# Patient Record
Sex: FEMALE | Race: BLACK OR AFRICAN AMERICAN | URBAN - METROPOLITAN AREA
[De-identification: names, ages, dates, MRNs, and addresses within clinical notes are randomized per-mention and may not be internally consistent; named-entity substitution may affect disease eponyms.]

---

## 2022-06-09 ENCOUNTER — EMERGENCY (EMERGENCY)
Facility: HOSPITAL | Age: 27
LOS: 1 days | Discharge: HOME | End: 2022-06-09
Attending: EMERGENCY MEDICINE
Payer: SELF-PAY

## 2022-06-09 VITALS
TEMPERATURE: 98 F | SYSTOLIC BLOOD PRESSURE: 119 MMHG | OXYGEN SATURATION: 98 % | DIASTOLIC BLOOD PRESSURE: 77 MMHG | HEIGHT: 67 IN | WEIGHT: 162.04 LBS | RESPIRATION RATE: 18 BRPM | HEART RATE: 82 BPM

## 2022-06-09 PROCEDURE — 99053 MED SERV 10PM-8AM 24 HR FAC: CPT

## 2022-06-09 PROCEDURE — 99282 EMERGENCY DEPT VISIT SF MDM: CPT

## 2022-06-09 PROCEDURE — 99283 EMERGENCY DEPT VISIT LOW MDM: CPT

## 2022-06-09 NOTE — ED ADULT TRIAGE NOTE - CHIEF COMPLAINT QUOTE
patient states that she cut her left foot on a tree yesterday states wound with redness and pus from bottom of foot

## 2022-06-10 NOTE — ED PROVIDER NOTE - PATIENT PORTAL LINK FT
You can access the FollowMyHealth Patient Portal offered by [facility] by registering at the following website: [email or web address]. By joining [employer]’s FollowMyHealth portal, you will also be able to view your health information using other applications (apps) compatible with our system.

## 2022-06-10 NOTE — ED PROVIDER NOTE - CLINICAL SUMMARY MEDICAL DECISION MAKING FREE TEXT BOX
skin avulsion to bottom of the foot, wrapped, cleaned and applied bacitracin, s/s of infection discussed

## 2022-06-10 NOTE — ED PROVIDER NOTE - NS ED ATTENDING STATEMENT MOD
This was a shared visit with the ELODIA. I reviewed and verified the documentation and independently performed the documented:

## 2022-06-10 NOTE — ED PROVIDER NOTE - OBJECTIVE STATEMENT
26 y/o female with no sign medical history presents to the ED c/o left foot pain. notes she was walkign in the streets yesterday barefoot and she scraped the bottom of her foot on the street. Notes covered it but became concerned today when she noticed some discharge form the wound. no fevers. NO pain to the bottom of her foot. able to walk without issues. Denies n/v, redness, swelling.

## 2022-06-10 NOTE — ED PROVIDER NOTE - PHYSICAL EXAMINATION
Superficial avulsion of the bottom of the left foot at the base of the great toe and the Metatarsal joint, FROM of the foot, 5/5 strength, no erythema , no discharge  Pulses- 2+ DP/PT

## 2022-08-31 ENCOUNTER — EMERGENCY (EMERGENCY)
Facility: HOSPITAL | Age: 27
LOS: 1 days | Discharge: HOME | End: 2022-08-31
Attending: EMERGENCY MEDICINE
Payer: SELF-PAY

## 2022-08-31 VITALS
SYSTOLIC BLOOD PRESSURE: 125 MMHG | OXYGEN SATURATION: 99 % | HEART RATE: 78 BPM | RESPIRATION RATE: 19 BRPM | TEMPERATURE: 98 F | DIASTOLIC BLOOD PRESSURE: 85 MMHG

## 2022-08-31 VITALS — WEIGHT: 149.91 LBS | HEIGHT: 67 IN
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
## 2022-08-31 DIAGNOSIS — Z88.0 ALLERGY STATUS TO PENICILLIN: ICD-10-CM

## 2022-08-31 DIAGNOSIS — S60.042A CONTUSION OF LEFT RING FINGER WITHOUT DAMAGE TO NAIL, INITIAL ENCOUNTER: ICD-10-CM
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
## 2022-08-31 DIAGNOSIS — S60.945A UNSPECIFIED SUPERFICIAL INJURY OF LEFT RING FINGER, INITIAL ENCOUNTER: ICD-10-CM

## 2022-08-31 PROCEDURE — 73140 X-RAY EXAM OF FINGER(S): CPT | Mod: LT

## 2022-08-31 PROCEDURE — 73140 X-RAY EXAM OF FINGER(S): CPT | Mod: 26,LT

## 2022-08-31 PROCEDURE — 99053 MED SERV 10PM-8AM 24 HR FAC: CPT

## 2022-08-31 PROCEDURE — 99283 EMERGENCY DEPT VISIT LOW MDM: CPT

## 2022-08-31 NOTE — ED ADULT TRIAGE NOTE - CHIEF COMPLAINT QUOTE
patient ambulatory to triage c/o L ring finger injury. patient unsure how injury occurred. patient c/o pain 10/10. swelling present to finger, unable to bend finger. allergic to amoxacillin.

## 2022-09-14 NOTE — ED PROVIDER NOTE - PATIENT PORTAL LINK FT
You can access the FollowMyHealth Patient Portal offered by [facility] by registering at the following website: [email or web address]. By joining [employer]’s FollowMyHealth portal, you will also be able to view your health information using other applications (apps) compatible with our system.

## 2022-09-14 NOTE — ED PROVIDER NOTE - OBJECTIVE STATEMENT
patient ambulatory to triage c/o L ring finger injury. patient unsure how injury occurred. patient c/o pain 10/10. swelling present to finger, unable to bend finger completely no left hand or left wrist pain. no overt motor or sensory deficits. no other acute issues symptoms or concerns.

## 2022-09-18 ENCOUNTER — EMERGENCY (EMERGENCY)
Facility: HOSPITAL | Age: 27
LOS: 0 days | Discharge: HOME | End: 2022-09-18
Attending: EMERGENCY MEDICINE
Payer: SELF-PAY

## 2022-09-18 VITALS
DIASTOLIC BLOOD PRESSURE: 78 MMHG | HEART RATE: 64 BPM | SYSTOLIC BLOOD PRESSURE: 128 MMHG | RESPIRATION RATE: 16 BRPM | TEMPERATURE: 98 F | OXYGEN SATURATION: 100 %

## 2022-09-18 VITALS — HEIGHT: 68 IN | WEIGHT: 134.92 LBS

## 2022-09-18 DIAGNOSIS — M79.89 OTHER SPECIFIED SOFT TISSUE DISORDERS: ICD-10-CM

## 2022-09-18 DIAGNOSIS — Z88.0 ALLERGY STATUS TO PENICILLIN: ICD-10-CM

## 2022-09-18 DIAGNOSIS — H57.10 OCULAR PAIN, UNSPECIFIED EYE: ICD-10-CM

## 2022-09-18 DIAGNOSIS — H10.9 UNSPECIFIED CONJUNCTIVITIS: ICD-10-CM

## 2022-09-18 PROCEDURE — 99283 EMERGENCY DEPT VISIT LOW MDM: CPT

## 2022-09-18 PROCEDURE — 99284 EMERGENCY DEPT VISIT MOD MDM: CPT

## 2022-09-18 RX ORDER — FLUORESCEIN SODIUM 9 MG
1 STRIP OPHTHALMIC (EYE) ONCE
Refills: 0 | Status: COMPLETED | OUTPATIENT
Start: 2022-09-18 | End: 2022-09-18

## 2022-09-18 RX ORDER — CIPROFLOXACIN LACTATE 400MG/40ML
500 VIAL (ML) INTRAVENOUS ONCE
Refills: 0 | Status: COMPLETED | OUTPATIENT
Start: 2022-09-18 | End: 2022-09-18

## 2022-09-18 RX ORDER — CIPROFLOXACIN HCL 0.3 %
2 DROPS OPHTHALMIC (EYE) ONCE
Refills: 0 | Status: COMPLETED | OUTPATIENT
Start: 2022-09-18 | End: 2022-09-18

## 2022-09-18 RX ORDER — OFLOXACIN 0.3 %
1 DROPS OPHTHALMIC (EYE) ONCE
Refills: 0 | Status: DISCONTINUED | OUTPATIENT
Start: 2022-09-18 | End: 2022-09-18

## 2022-09-18 RX ORDER — CIPROFLOXACIN HCL 0.3 %
2 DROPS OPHTHALMIC (EYE)
Qty: 1 | Refills: 0
Start: 2022-09-18 | End: 2022-09-22

## 2022-09-18 RX ADMIN — Medication 1 DROP(S): at 23:42

## 2022-09-18 RX ADMIN — Medication 500 MILLIGRAM(S): at 23:51

## 2022-09-18 RX ADMIN — Medication 1 APPLICATION(S): at 23:41

## 2022-09-18 RX ADMIN — Medication 2 DROP(S): at 23:51

## 2022-09-18 NOTE — ED STATDOCS - NSFOLLOWUPINSTRUCTIONS_ED_ALL_ED_FT
FOLLOW UP AT THE EYE CLINIC TOMORROW. THEY WILL CALL YOU IN THE MORNING TO DISCUSS YOUR APPOINTMENT TIME. RETURN TO ER FOR ANY WORSENING SYMPTOMS OR NEW CONCERNS.     Conjunctivitis    WHAT YOU NEED TO KNOW:    What is conjunctivitis? Conjunctivitis, or pink eye, is inflammation of your conjunctiva. The conjunctiva is a thin tissue that covers the front of your eye and the back of your eyelids. The conjunctiva helps protect your eye and keep it moist.   Conjunctivitis         What causes conjunctivitis? Conjunctivitis is easily spread from person to person. The most common cause of conjunctivitis is infection with bacteria or a virus. This often happens when bacteria gets into your eye. This can happen when you touch your eye or wear contact lenses. Allergies are also a common cause of conjunctivitis. The cells in your conjunctiva can react to an allergen. Some examples of allergens include grass, dust, animal fur, or mascara.    What are the signs and symptoms of conjunctivitis? You will usually have symptoms in both eyes if your conjunctivitis is caused by allergies. You may also have other allergic symptoms, such as a rash or runny nose. Symptoms will usually start in 1 eye if your conjunctivitis is caused by a virus or bacteria. You may also have other symptoms of an infection, such as sore throat and fever. You may have any of the following:   •Redness in the whites of your eye      •Itching in your eye or around your eye      •Feeling like there is something in your eye      •Watery or thick, sticky discharge      •Crusty eyelids when you wake up in the morning      •Burning, stinging, or swelling in your eye      •Pain when you see bright light      How is conjunctivitis diagnosed? Your healthcare provider will ask about your symptoms and medical history. The provider will ask if you have been around anyone who is sick or has pink eye. The provider will ask if you have allergies. Tell your provider if you wear contact lenses. You may need any of the following:   •An eye exam will be done by your provider. Your provider will look at your eyes, eyelids, eyelashes, and the skin around your eyes. You will be asked to look in different directions. Your provider may gently press on your eye or eyelid to see if there is drainage. Your provider will also look for redness and swelling in your eyelids or conjunctiva. Your provider may gently swab your conjunctiva with a cotton swab and send it to the lab for tests. This will help your provider find out what is causing your conjunctivitis.       •A slit-lamp microscope is a special microscope with a bright light used to look into your eye. Your provider will look for signs of infection or inflammation. This microscope also helps your provider see if the different parts of your eyes are healthy.      How is conjunctivitis treated? Your conjunctivitis may go away on its own. Treatment depends on what is causing your conjunctivitis. You may need any of the following:   •Allergy medicine helps decrease itchy, red, swollen eyes caused by allergies. It may be given as a pill, eye drops, or nasal spray.      •Antibiotics may be needed if your conjunctivitis is caused by bacteria. This medicine may be given as a pill, eye drops, or eye ointment.      How can I manage my symptoms?   •Apply a cool compress. Wet a washcloth with cold water and place it on your eye. This will help decrease itching and irritation.      •Do not wear contact lenses. They can irritate your eye. Throw away the pair you are using and ask when you can wear them again. Use a new pair of lenses when your provider says it is okay.       •Avoid irritants. Stay away from smoke filled areas. Shield your eyes from wind and sun.       •Flush your eye. You may need to flush your eye with saline to help decrease your symptoms. Ask for more information on how to flush your eye.       How do I prevent the spread of conjunctivitis?   •Wash your hands with soap and water often. Wash your hands before and after you touch your eyes. Also wash your hands before you prepare or eat food and after you use the bathroom or change a diaper.  Handwashing           •Avoid allergens. Try to avoid the things that cause your allergies, such as pets, dust, or grass.       •Avoid contact with others. Do not share towels or washcloths. Try to stay away from others as much as possible. Ask when you can return to work or school.       •Throw away eye makeup. The bacteria that caused your conjunctivitis can stay in eye makeup. Throw away your current mascara and other eye makeup. Never share mascara or other eye makeup with anyone.      When should I seek immediate care?   •You have worsening eye pain.       •The swelling in your eye gets worse, even after treatment.       •Your vision suddenly becomes worse or you cannot see at all.      When should I call my doctor?   •You develop a fever and ear pain.      •You have tiny bumps or spots of blood on your eye.      •You have questions or concerns about your condition or care.      CARE AGREEMENT:    You have the right to help plan your care. Learn about your health condition and how it may be treated. Discuss treatment options with your healthcare providers to decide what care you want to receive. You always have the right to refuse treatment.        © Copyright [employer] 2022

## 2022-09-18 NOTE — ED STATDOCS - OBJECTIVE STATEMENT
27 year old female with PMH of chronic eye cyst, presents with cc of discharge from the eye this morning, no visual changes, no pain on EOM, no epistaxis. no facial pain. No neck pain or stiffness. No cp, sob or palpitation. No abdominal pain. No hip pain or limb pain. Ambulatory. No fever or chills. No nausea or vomiting. No melena or hematochezia. No recent trauma. Patient has been offered before to remove the cyst but at the time wished to avoid, will follow up with optho. No saddle anesthesia. No incontinence of urine or stool.

## 2022-09-18 NOTE — ED STATDOCS - CARDIAC, MLM
normal rate, regular rhythm, and no gallops or rubs. 2+ pulses in bilateral dp and radial arteries. cap refill less than 2 seconds.

## 2022-09-18 NOTE — ED STATDOCS - ATTENDING APP SHARED VISIT CONTRIBUTION OF CARE
I Dewayne Loredo MD saw and examined the patient. MLP saw and examined the patient under my supervision. I discussed the care of the patient with MLP and agree with MLP's plan, assessment and care of the patient while in the ED.

## 2022-09-18 NOTE — ED STATDOCS - PROGRESS NOTE DETAILS
signed Sneha Talbot PA-C Pt seen initially in intake by Dr Loredo.   27F c/o discharge and crust in right eye this morning where she had to pry her eye open and wash it clean. Pt denies fever, cough, or other symptoms. no vision change. pt does not war glasses or contacts. Pt also has a small cyst like structure jut medial to the lateral canthus of her right eye on the mucous membrane. Pt says she has a swelling on the lateral aspect of her right eye which was worked up with her eye doctor early this summer including imaging and she needed antibiotics and eye drops and it got smaller. Pt had declined to have it removed at that time. Pt says she feels like the area is getting bigger again the past 2 days and when she tried to make an appt with her eye doctor the soonest appt was 10/28 and pt is very worried. Pt alert, NAD, visual acuity 20/40 both eyes. pupils 4mm bilateral, EOMI. Woods lamp exam performed with fluoresceine. Cornea clear, sclera white, no abnormal dye uptake, no abrasions, negative Arielle, no dendritic lesions. No subconjunctival hemorrhage or hyphema. small 1-2mm cystic appearing lesion on mucosa of right eye just medial to lateral canthus. No discharge. Will treat for possible conjunctivitis as pt reports crusting today. UNclear what swelling is due to or represents. I spoke to optho resident Dr Calle via transfer center, he will have office contact pt tomorrow for appt tomorrow. Pt feeling well at GA, agrees with GA and plan of care. signed Sneha Talbot PA-C Pt seen initially in intake by Dr Loredo.   27F c/o discharge and crust in right eye this morning where she had to pry her eye open and wash it clean. Pt denies fever, cough, or other symptoms. no vision change. pt does not war glasses or contacts. Pt also has a small cyst like structure jut medial to the lateral canthus of her right eye on the mucous membrane. Pt says she has a swelling on the lateral aspect of her right eye which was worked up with her eye doctor early this summer including imaging and she needed antibiotics and eye drops and it got smaller. Pt had declined to have it removed at that time. Pt says she feels like the area is getting bigger again the past 2 days and when she tried to make an appt with her eye doctor the soonest appt was 10/28 and pt is very worried. Pt alert, NAD, visual acuity 20/40 both eyes. pupils 4mm bilateral, EOMI. Woods lamp exam performed with fluoresceine. Cornea clear, sclera white, no abnormal dye uptake, no abrasions, negative Arielle, no dendritic lesions. No subconjunctival hemorrhage or hyphema. small 1-2mm cystic appearing lesion on mucosa of right eye just medial to lateral canthus. No discharge. Will treat for possible conjunctivitis as pt reports crusting today. UNclear what swelling is due to or represents. I spoke to optho resident Dr Calle via transfer center, he will have office contact pt tomorrow for appt tomorrow. Pt feeling well at OH, agrees with OH and plan of care.    Facundo MCCONNELL: Nontoxic appearing, vision intact, has outpatient follow up with ophtho, abx provided, patient is happy with care provided and will follow up with ophtho.

## 2022-09-18 NOTE — ED STATDOCS - MUSCULOSKELETAL, MLM
range of motion is not limited and there is no muscle tenderness. 5/5 strength on flexion and extension of all limbs. No nuchal rigidity.

## 2022-09-18 NOTE — ED STATDOCS - CARE PLAN
Principal Discharge DX:	Conjunctivitis  Secondary Diagnosis:	Soft tissue swelling   1 Principal Discharge DX:	Conjunctivitis  Goal:	abx use in the eye, follow up with ophtho  Secondary Diagnosis:	Soft tissue swelling

## 2022-09-18 NOTE — ED STATDOCS - ENMT, MLM
Nasal mucosa clear.  Mouth with normal mucosa  Throat has no vesicles, no oropharyngeal exudates and uvula is midline. No epistaxis.

## 2022-09-18 NOTE — ED STATDOCS - EYES, MLM
clear bilaterally.  Pupils equal, round, and reactive to light. EOMI. Visual fields intact x 4 quadrants. Mild conjunctival injection and erythema. There is the presence of the chronic cyst in the Rt eye.

## 2022-09-18 NOTE — ED ADULT TRIAGE NOTE - MEANS OF ARRIVAL
----- Message from Leland Medina MD sent at 6/29/2022  7:44 AM CDT -----  Thyroid level improved.  Continue current medication dosing and recommend recheck of TSH with reflex in 3-4 months to verify stability.  
Jeniffer called back and notified. She was helped to make the lab appointment  
Left voice message for patient to return call.   
ambulatory

## 2022-09-18 NOTE — ED STATDOCS - CLINICAL SUMMARY MEDICAL DECISION MAKING FREE TEXT BOX
signed Sneha Talbot PA-C   Pt with possible conjuctivitis and also has soft tissue swelling of right eye. DC with cipro gtt, appt with optho clinic tomorrow. Pt feeling well at DC, agrees with DC and plan of care.

## 2022-09-18 NOTE — ED STATDOCS - PATIENT PORTAL LINK FT
You can access the FollowMyHealth Patient Portal offered by [facility] by registering at the following website: [email or web address]. By joining [employer]’s FollowMyHealth portal, you will also be able to view your health information using other applications (apps) compatible with our system.

## 2022-09-18 NOTE — ED ADULT TRIAGE NOTE - CHIEF COMPLAINT QUOTE
Patient presents to the ED with a right eye cyst. States she is suppose to see eye Dr 10/28, but cannot wait. States cyst is getting bigger and her vision is blurry. Endorse some pain

## 2024-08-08 ENCOUNTER — ASOB RESULT (OUTPATIENT)
Age: 29
End: 2024-08-08

## 2024-08-08 ENCOUNTER — APPOINTMENT (OUTPATIENT)
Dept: ANTEPARTUM | Facility: CLINIC | Age: 29
End: 2024-08-08

## 2024-08-08 PROCEDURE — 76811 OB US DETAILED SNGL FETUS: CPT

## 2024-08-08 PROCEDURE — 76817 TRANSVAGINAL US OBSTETRIC: CPT

## 2024-08-12 ENCOUNTER — APPOINTMENT (OUTPATIENT)
Dept: MATERNAL FETAL MEDICINE | Facility: CLINIC | Age: 29
End: 2024-08-12
Payer: MEDICAID

## 2024-08-12 ENCOUNTER — APPOINTMENT (OUTPATIENT)
Dept: ANTEPARTUM | Facility: CLINIC | Age: 29
End: 2024-08-12
Payer: MEDICAID

## 2024-08-12 VITALS
HEART RATE: 76 BPM | SYSTOLIC BLOOD PRESSURE: 112 MMHG | DIASTOLIC BLOOD PRESSURE: 78 MMHG | BODY MASS INDEX: 26.98 KG/M2 | OXYGEN SATURATION: 99 % | WEIGHT: 178 LBS | HEIGHT: 68 IN

## 2024-08-12 DIAGNOSIS — Z86.018 PERSONAL HISTORY OF OTHER BENIGN NEOPLASM: ICD-10-CM

## 2024-08-12 DIAGNOSIS — Z82.49 FAMILY HISTORY OF ISCHEMIC HEART DISEASE AND OTHER DISEASES OF THE CIRCULATORY SYSTEM: ICD-10-CM

## 2024-08-12 DIAGNOSIS — Z80.3 FAMILY HISTORY OF MALIGNANT NEOPLASM OF BREAST: ICD-10-CM

## 2024-08-12 PROCEDURE — ZZZZZ: CPT

## 2024-08-12 PROCEDURE — 99205 OFFICE O/P NEW HI 60 MIN: CPT | Mod: TH

## 2024-08-12 RX ORDER — CHOLECALCIFEROL (VITAMIN D3) 25 MCG
27-0.8-228 TABLET,CHEWABLE ORAL
Refills: 0 | Status: ACTIVE | COMMUNITY
Start: 2024-08-12

## 2024-08-12 NOTE — HISTORY OF PRESENT ILLNESS
[FreeTextEntry1] : Cyndy presents today for discussion of the previously made diagnosis of Gastroschesis

## 2024-08-12 NOTE — DATA REVIEWED
[FreeTextEntry1] : Cyndy was late to care, only establishing her first prenatal visit 2-3 weeks ago.   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 I reviewed these images with Cyndy and her mother, and had a long discussion regarding the finding and the potential for more issues beyond the abdomen  While a fetal echo referral was given, she did not make her appointment for several weeks, so our patient navigator will help facilitate and earlier appointment. Given her late to care, she reports no genetic testing has been done, and an amniocentesis was offered and will be scheuled for later this week.  I counseled Cyndy of the potential for aneuploidy, congenital heart defects, IUGR,  delivery as well as the potential for ischemic damage to the intesting. They are aware of the absolute need for surgery, as well as the high liklihood for multiple surgeries. No defnitive prognosis was given, other than a range of potential outcomes. The need to delivery at a tertiary care center was also reviewed.  The option of termination was discussed, and the timing options of that as well. Cyndy was clear she would not consider terminating under any circumstances, even with multiple additional anomalies or aneuploidy.   We will work expediently to complete the full evaluation

## 2024-08-12 NOTE — DISCUSSION/SUMMARY
[FreeTextEntry1] : Fetal echo  Amniocentesis next week  Genetic counseling  Followup growth with MFM consult in 3 weeks.

## 2024-08-13 ENCOUNTER — ASOB RESULT (OUTPATIENT)
Age: 29
End: 2024-08-13

## 2024-08-13 ENCOUNTER — APPOINTMENT (OUTPATIENT)
Dept: PEDIATRIC CARDIOLOGY | Facility: CLINIC | Age: 29
End: 2024-08-13

## 2024-08-13 ENCOUNTER — APPOINTMENT (OUTPATIENT)
Dept: MATERNAL FETAL MEDICINE | Facility: CLINIC | Age: 29
End: 2024-08-13
Payer: MEDICAID

## 2024-08-13 DIAGNOSIS — O99.891 OTHER SPECIFIED DISEASES AND CONDITIONS COMPLICATING PREGNANCY: ICD-10-CM

## 2024-08-13 DIAGNOSIS — Z3A.20 20 WEEKS GESTATION OF PREGNANCY: ICD-10-CM

## 2024-08-13 DIAGNOSIS — Q79.3 OTHER SPECIFIED DISEASES AND CONDITIONS COMPLICATING PREGNANCY: ICD-10-CM

## 2024-08-13 DIAGNOSIS — O35.9XX0 MATERNAL CARE FOR (SUSPECTED) FETAL ABNORMALITY AND DAMAGE, UNSPECIFIED, NOT APPLICABLE OR UNSPECIFIED: ICD-10-CM

## 2024-08-13 PROCEDURE — 93325 DOPPLER ECHO COLOR FLOW MAPG: CPT | Mod: 59

## 2024-08-13 PROCEDURE — 99442: CPT | Mod: 93

## 2024-08-13 PROCEDURE — 76827 ECHO EXAM OF FETAL HEART: CPT

## 2024-08-13 PROCEDURE — 99204 OFFICE O/P NEW MOD 45 MIN: CPT | Mod: 25

## 2024-08-13 PROCEDURE — 76820 UMBILICAL ARTERY ECHO: CPT

## 2024-08-13 PROCEDURE — 76821 MIDDLE CEREBRAL ARTERY ECHO: CPT

## 2024-08-13 PROCEDURE — 76825 ECHO EXAM OF FETAL HEART: CPT

## 2024-08-14 ENCOUNTER — TRANSCRIPTION ENCOUNTER (OUTPATIENT)
Age: 29
End: 2024-08-14

## 2024-08-16 ENCOUNTER — NON-APPOINTMENT (OUTPATIENT)
Age: 29
End: 2024-08-16

## 2024-08-28 ENCOUNTER — NON-APPOINTMENT (OUTPATIENT)
Age: 29
End: 2024-08-28

## 2024-09-06 ENCOUNTER — APPOINTMENT (OUTPATIENT)
Dept: ANTEPARTUM | Facility: CLINIC | Age: 29
End: 2024-09-06
Payer: MEDICAID

## 2024-09-06 ENCOUNTER — APPOINTMENT (OUTPATIENT)
Dept: MATERNAL FETAL MEDICINE | Facility: CLINIC | Age: 29
End: 2024-09-06

## 2024-09-06 ENCOUNTER — ASOB RESULT (OUTPATIENT)
Age: 29
End: 2024-09-06

## 2024-09-06 VITALS
HEIGHT: 68 IN | OXYGEN SATURATION: 98 % | BODY MASS INDEX: 30.31 KG/M2 | SYSTOLIC BLOOD PRESSURE: 114 MMHG | DIASTOLIC BLOOD PRESSURE: 62 MMHG | WEIGHT: 200 LBS | HEART RATE: 87 BPM | RESPIRATION RATE: 18 BRPM

## 2024-09-06 DIAGNOSIS — Z3A.24 24 WEEKS GESTATION OF PREGNANCY: ICD-10-CM

## 2024-09-06 DIAGNOSIS — O36.5990 MATERNAL CARE FOR OTHER KNOWN OR SUSPECTED POOR FETAL GROWTH, UNSPECIFIED TRIMESTER, NOT APPLICABLE OR UNSPECIFIED: ICD-10-CM

## 2024-09-06 PROCEDURE — 76820 UMBILICAL ARTERY ECHO: CPT | Mod: 59

## 2024-09-06 PROCEDURE — 99215 OFFICE O/P EST HI 40 MIN: CPT | Mod: TH

## 2024-09-06 PROCEDURE — 76816 OB US FOLLOW-UP PER FETUS: CPT

## 2024-09-18 ENCOUNTER — APPOINTMENT (OUTPATIENT)
Dept: PEDIATRIC SURGERY | Facility: CLINIC | Age: 29
End: 2024-09-18
Payer: MEDICAID

## 2024-09-18 PROCEDURE — 99204 OFFICE O/P NEW MOD 45 MIN: CPT

## 2024-09-19 ENCOUNTER — APPOINTMENT (OUTPATIENT)
Dept: ANTEPARTUM | Facility: CLINIC | Age: 29
End: 2024-09-19

## 2024-09-20 ENCOUNTER — NON-APPOINTMENT (OUTPATIENT)
Age: 29
End: 2024-09-20

## 2024-09-20 NOTE — CONSULT LETTER
[Dear  ___] : Dear  [unfilled], [Consult Letter:] : I had the pleasure of evaluating your patient, [unfilled]. [Consult Closing:] : Thank you very much for allowing me to participate in the care of this patient.  If you have any questions, please do not hesitate to contact me. [Sincerely,] : Sincerely, [FreeTextEntry2] : Dr Ame Cochran [address] [place] [address], [address] [place] [address] Phone: [phone] [FreeTextEntry3] : Tucker Wakefield MD Division of Pediatric Surgery [facility]

## 2024-09-20 NOTE — CONSULT LETTER
[Dear  ___] : Dear  [unfilled], [Consult Letter:] : I had the pleasure of evaluating your patient, [unfilled]. [Consult Closing:] : Thank you very much for allowing me to participate in the care of this patient.  If you have any questions, please do not hesitate to contact me. [Sincerely,] : Sincerely, [FreeTextEntry2] : Dr Ame Cochran [address] [place] [address], [address] [place] [address] Phone: [phone] [FreeTextEntry3] : Tucker Wakefield MD Division of Pediatric Surgery [facility]

## 2024-09-20 NOTE — REASON FOR VISIT
[New Patient Prenatal Visit] : a new patient prenatal visit [FreeTextEntry4] : Dr Ame Cochran [Patient] : patient [Mother] : mother

## 2024-09-20 NOTE — ASSESSMENT
[FreeTextEntry1] : Ms Cobos is carrying a 25 week gestational age fetus recently diagnosed with gastroschisis. The abdominal wall defect was discovered on fetal ultrasounds. She is here today regarding the care of her fetus.   I met with  and counselled her about abdominal wall defects.. I explained that the purpose of the visit was to provide her with information about the baby's condition and prognosis and what they may anticipate for the future. I explained the nature of what gastroschisis is, and how it is a surgical condition that requires repair after birth. I spent a significant amount of time explaining the potential effects on the bowel that can occur. I explained that the effects are variable, and difficult to predict prior to birth.  I reviewed in detail the care that these babies receive after birth. She is aware that the bowel will be placed in a silo after birth and will be serially reduced in order to be ready for a formal closure.  However, the timing of this is multi-factorial and will depend on the baby's condition and anatomy. I discussed the different methods for repair including a possible sutureless closure versus a formal repair in the operating room. She understands that a sutureless closure may avoid the need for general anesthesia; however, the possibility of performing this technique will be determined after birth and is based on a variety of factors.   I explained that typically gastroschisis are simple without any other associated intestinal findings and in this case the most likely course will be a sutureless closure.  However, even in this case, she understands the bowel is often affected by the chronic exposure to amniotic fluid causing a prolonged ileus and/or motility dysfunction.  I also reviewed the more rare possibility of a complicated gastroschisis including perforations, intestinal necrosis or a closing gastroschisis, each of which would require an emergent intervention after birth.   I also reviewed the association of intestinal atresia with gastroschisis which would require a second, more delayed procedure. She understands that if an atresia was seen or suspected after birth, it would not be repaired at the time of the reduction and abdominal wall repair, and that if present, would require another operation at a later date after the diagnosis is made, usually after 4-6 weeks of observing the baby.    I reviewed the motility issues that are frequently seen after gastroschisis repair, and they are aware that it may take several weeks to months for the baby to get up to full feeds and to be discharged from the hospital, thereby requiring a prolonged NICU stay.  She understands the baby will receiving nutrition in the form of TPN until the baby is getting adequate nutrition enterally.     I discussed what we know about the long-term outcomes are for babies born with gastroschisis. I told her that, aside for associated bowel abnormalities including reflux, motility anomalies, NEC, malrotation and future bowel obstructions, gastroschisis is typically not associated with other developmental defects or chromosomal abnormalities.     We discussed the method of delivery, and I explained that there was no evidence that operative delivery was safer than vaginal delivery for the infant. C section is indicated for obstetrical reasons alone. She is aware that babies with gastroschisis are frequently born prematurely, and that it was very possible that she would deliver before her due date.   I explained who the different doctors and services were that would be helping to care for the infant after birth, and I reviewed the concept of cooperative care. I told them a little bit about the Division of Pediatric Surgery here at [facility], and that there would likely be several surgeons helping to care for her child. I discussed that we work closely with the NICU team and she is interested in a NICU consultation.  She is concerned about living far away and I discussed with her that we have a [facility] house that she is interested in hearing more about.   I answered all of her questions to the best of my ability.  She has my contact information and knows to contact me with any further questions or concerns.

## 2024-09-20 NOTE — ASSESSMENT
[FreeTextEntry1] : Ms Cobos is carrying a 25 week gestational age fetus recently diagnosed with gastroschisis. The abdominal wall defect was discovered on fetal ultrasounds. She is here today regarding the care of her fetus.   I met with  and counselled her about abdominal wall defects.. I explained that the purpose of the visit was to provide her with information about the baby's condition and prognosis and what they may anticipate for the future. I explained the nature of what gastroschisis is, and how it is a surgical condition that requires repair after birth. I spent a significant amount of time explaining the potential effects on the bowel that can occur. I explained that the effects are variable, and difficult to predict prior to birth.  I reviewed in detail the care that these babies receive after birth. She is aware that the bowel will be placed in a silo after birth and will be serially reduced in order to be ready for a formal closure.  However, the timing of this is multi-factorial and will depend on the baby's condition and anatomy. I discussed the different methods for repair including a possible sutureless closure versus a formal repair in the operating room. She understands that a sutureless closure may avoid the need for general anesthesia; however, the possibility of performing this technique will be determined after birth and is based on a variety of factors.   I explained that typically gastroschisis are simple without any other associated intestinal findings and in this case the most likely course will be a sutureless closure.  However, even in this case, she understands the bowel is often affected by the chronic exposure to amniotic fluid causing a prolonged ileus and/or motility dysfunction.  I also reviewed the more rare possibility of a complicated gastroschisis including perforations, intestinal necrosis or a closing gastroschisis, each of which would require an emergent intervention after birth.   I also reviewed the association of intestinal atresia with gastroschisis which would require a second, more delayed procedure. She understands that if an atresia was seen or suspected after birth, it would not be repaired at the time of the reduction and abdominal wall repair, and that if present, would require another operation at a later date after the diagnosis is made, usually after 4-6 weeks of observing the baby.    I reviewed the motility issues that are frequently seen after gastroschisis repair, and they are aware that it may take several weeks to months for the baby to get up to full feeds and to be discharged from the hospital, thereby requiring a prolonged NICU stay.  She understands the baby will receiving nutrition in the form of TPN until the baby is getting adequate nutrition enterally.     I discussed what we know about the long-term outcomes are for babies born with gastroschisis. I told her that, aside for associated bowel abnormalities including reflux, motility anomalies, NEC, malrotation and future bowel obstructions, gastroschisis is typically not associated with other developmental defects or chromosomal abnormalities.     We discussed the method of delivery, and I explained that there was no evidence that operative delivery was safer than vaginal delivery for the infant. C section is indicated for obstetrical reasons alone. She is aware that babies with gastroschisis are frequently born prematurely, and that it was very possible that she would deliver before her due date.   I explained who the different doctors and services were that would be helping to care for the infant after birth, and I reviewed the concept of cooperative care. I told them a little bit about the Division of Pediatric Surgery here at [facility], and that there would likely be several surgeons helping to care for her child. I discussed that we work closely with the NICU team and she is interested in a NICU consultation.  She is concerned about living far away and I discussed with her that we have a [facility] house that she is interested in hearing more about.   I answered all of her questions to the best of my ability.  She has my contact information and knows to contact me with any further questions or concerns.

## 2024-09-20 NOTE — HISTORY OF PRESENT ILLNESS
[FreeTextEntry1] : Ms Cobos is a 29 year old female here today carrying a 25 week gestational age fetus found to have gastroschisis on prenatal imaging.  She is here today to further discuss these findings and the plan of care for her baby after birth.

## 2024-09-26 ENCOUNTER — APPOINTMENT (OUTPATIENT)
Dept: ANTEPARTUM | Facility: CLINIC | Age: 29
End: 2024-09-26
Payer: MEDICAID

## 2024-09-26 ENCOUNTER — ASOB RESULT (OUTPATIENT)
Age: 29
End: 2024-09-26

## 2024-09-26 PROCEDURE — 76820 UMBILICAL ARTERY ECHO: CPT

## 2024-09-26 PROCEDURE — 76819 FETAL BIOPHYS PROFIL W/O NST: CPT

## 2024-10-07 ENCOUNTER — APPOINTMENT (OUTPATIENT)
Dept: ANTEPARTUM | Facility: CLINIC | Age: 29
End: 2024-10-07